# Patient Record
Sex: MALE | Race: WHITE
[De-identification: names, ages, dates, MRNs, and addresses within clinical notes are randomized per-mention and may not be internally consistent; named-entity substitution may affect disease eponyms.]

---

## 2018-06-15 PROBLEM — Z00.00 ENCOUNTER FOR PREVENTIVE HEALTH EXAMINATION: Status: ACTIVE | Noted: 2018-06-15

## 2018-06-26 ENCOUNTER — APPOINTMENT (OUTPATIENT)
Dept: ORTHOPEDIC SURGERY | Facility: CLINIC | Age: 78
End: 2018-06-26
Payer: MEDICARE

## 2018-06-26 VITALS
SYSTOLIC BLOOD PRESSURE: 148 MMHG | WEIGHT: 164 LBS | HEIGHT: 69 IN | HEART RATE: 76 BPM | DIASTOLIC BLOOD PRESSURE: 88 MMHG | BODY MASS INDEX: 24.29 KG/M2

## 2018-06-26 DIAGNOSIS — M19.042 PRIMARY OSTEOARTHRITIS, LEFT HAND: ICD-10-CM

## 2018-06-26 DIAGNOSIS — M25.531 PAIN IN RIGHT WRIST: ICD-10-CM

## 2018-06-26 PROCEDURE — 73110 X-RAY EXAM OF WRIST: CPT | Mod: RT

## 2018-06-26 PROCEDURE — 99203 OFFICE O/P NEW LOW 30 MIN: CPT | Mod: 25

## 2018-06-26 PROCEDURE — 20605 DRAIN/INJ JOINT/BURSA W/O US: CPT | Mod: RT

## 2018-06-26 PROCEDURE — 73130 X-RAY EXAM OF HAND: CPT | Mod: LT

## 2018-06-26 RX ORDER — ATORVASTATIN CALCIUM 80 MG/1
TABLET, FILM COATED ORAL
Refills: 0 | Status: ACTIVE | COMMUNITY

## 2018-06-26 RX ORDER — DICYCLOMINE HYDROCHLORIDE 10 MG/1
10 CAPSULE ORAL
Refills: 0 | Status: ACTIVE | COMMUNITY

## 2018-06-26 RX ORDER — DUTASTERIDE 0.5 MG/1
0.5 CAPSULE, LIQUID FILLED ORAL
Refills: 0 | Status: ACTIVE | COMMUNITY

## 2018-06-26 RX ORDER — LORAZEPAM 0.5 MG/1
0.5 TABLET ORAL
Refills: 0 | Status: ACTIVE | COMMUNITY

## 2019-09-19 ENCOUNTER — RX RENEWAL (OUTPATIENT)
Age: 79
End: 2019-09-19

## 2019-09-19 ENCOUNTER — APPOINTMENT (OUTPATIENT)
Dept: ORTHOPEDIC SURGERY | Facility: CLINIC | Age: 79
End: 2019-09-19
Payer: MEDICARE

## 2019-09-19 VITALS
HEART RATE: 81 BPM | WEIGHT: 169 LBS | BODY MASS INDEX: 25.03 KG/M2 | DIASTOLIC BLOOD PRESSURE: 109 MMHG | SYSTOLIC BLOOD PRESSURE: 160 MMHG | HEIGHT: 69 IN

## 2019-09-19 DIAGNOSIS — Z85.46 PERSONAL HISTORY OF MALIGNANT NEOPLASM OF PROSTATE: ICD-10-CM

## 2019-09-19 DIAGNOSIS — M25.831 OTHER SPECIFIED JOINT DISORDERS, RIGHT WRIST: ICD-10-CM

## 2019-09-19 DIAGNOSIS — Z87.891 PERSONAL HISTORY OF NICOTINE DEPENDENCE: ICD-10-CM

## 2019-09-19 DIAGNOSIS — M18.11 UNILATERAL PRIMARY OSTEOARTHRITIS OF FIRST CARPOMETACARPAL JOINT, RIGHT HAND: ICD-10-CM

## 2019-09-19 DIAGNOSIS — Z72.89 OTHER PROBLEMS RELATED TO LIFESTYLE: ICD-10-CM

## 2019-09-19 PROCEDURE — 99214 OFFICE O/P EST MOD 30 MIN: CPT

## 2019-09-19 PROCEDURE — 73110 X-RAY EXAM OF WRIST: CPT | Mod: RT

## 2019-09-19 RX ORDER — MELOXICAM 7.5 MG/1
7.5 TABLET ORAL
Qty: 90 | Refills: 0 | Status: ACTIVE | COMMUNITY
Start: 2019-09-19 | End: 1900-01-01

## 2019-09-22 PROBLEM — Z85.46 HISTORY OF MALIGNANT NEOPLASM OF PROSTATE: Status: RESOLVED | Noted: 2019-09-22 | Resolved: 2019-09-22

## 2019-09-22 PROBLEM — Z87.891 FORMER SMOKER: Status: ACTIVE | Noted: 2019-09-22

## 2019-09-22 PROBLEM — Z72.89 ALCOHOL USE: Status: ACTIVE | Noted: 2019-09-22

## 2019-09-22 NOTE — HISTORY OF PRESENT ILLNESS
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 Patient denies any trauma or injury. He denies taking any medication for the pain. Patient reports grabbing objects and lifting objects worsen the pain in the left thumb. Patient reports the pain is not severe, but can be annoying. Patient has difficulty specifically lifting a half gallon container of milk. He states he has not tried wearing a splint for the thumb. \par Patient also reports ongoing pain in the left index finger PIP and DIP aspects for several years. 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 Patient denies any triggering or locking of the fingers. \par \par Hx: He fell October 2017, while hiking. No notable residual complaint. \par \par

## 2019-09-22 NOTE — PHYSICAL EXAM
[de-identified] : Left wrist\par Extension/flexion, 50°/50° without pain.\par No notable localizing wrist pain or tenderness.\par TFCC nontender.\par Basal joint prominent with swelling, first metacarpal, adduction.\par Basal joint manipulation, 2+ crepitus, 1+ pain, 1+ joint line tenderness\par ST-T nontender\par No A1 pulley tenderness and no triggering in any finger.\par No pertinent MP joint contributory findings, \par PIP 2 sl swelling ulnar angulation approx 5 degrees.\par DIP 2,4,5  Heberden's nodes; none are clinically painful.\par \par Right wrist\par TFCC manipulation. Slight tenderness.\par No other notable wrist joint finding.\par Basal joint manipulation. Minimal crepitus, minimal discomfort.\par No A1 pulley tenderness and no triggering in any finger.\par No pertinent MP, PIP, or DIP joint contributory findings, except some Heberden's nodes; none are clinically painful.\par \par Neurologic: Median, ulnar, and radial motor and sensory are intact. \par Skin: No cyanosis, clubbing, or rashes.\par Vascular: Radial pulses intact.\par Lymphatic: No streaking or epitrochlear adenopathy.\par The patient is awake, alert, and oriented. Affect appropriate. Cooperative.  [de-identified] : Radiographs ordered and interpreted by me today, reviewed and discussed with the patient today.\par 4 views, left wrist, and hand.\par Wrist and carpus without notable degenerative change.\par Sclerosis and mild narrowing basal joint. Volar beak osteophyte formation of first metacarpal increasing AP diameter. Volar beak articulating with trapezium. Native first metacarpal base dorsally positioned.\par Radiographically stage 2\par MP joints without degenerative change.\par Erosive osteoarthritis DIP 2, 4, 5, PIP 2. Mild to moderate degenerative change, DIP, 3, PIP, 3. No fractures or dislocations.

## 2019-09-22 NOTE — DISCUSSION/SUMMARY
[FreeTextEntry1] : The patient has left thumb basal joint osteoarthritis, radiographically, stage II. Patient has minimal symptoms. Patient will try activity modification as first line treatment. Meloxicam 7.5 mg prescribed to be taken as anti-inflammatory drugs are 5-10-15 days or even 3 weeks. Drug precautions explained. Medication can be deferred if pain is mild and not problematic. HMTS described; declined by Patient. Cortisone injection can be done if other treatment options are not effective.\par Prognosis limited.\par I have urged the patient to be realistic, and that  some degree of pain is to be expected and that having no pain is an unrealistic expectation.\par I urged the patient to tolerate mild discomfort.\par Return prn.\par  A lengthy and detailed discussion was held with the patient regarding analysis, treatment, and recommendations. All questions have been answered. At the conclusion the patient expressed acceptance and understanding.